# Patient Record
Sex: FEMALE | Race: WHITE
[De-identification: names, ages, dates, MRNs, and addresses within clinical notes are randomized per-mention and may not be internally consistent; named-entity substitution may affect disease eponyms.]

---

## 2023-01-30 ENCOUNTER — HOSPITAL ENCOUNTER (OUTPATIENT)
Dept: HOSPITAL 19 - COL.CAR | Age: 48
Discharge: HOME | End: 2023-01-30
Attending: INTERNAL MEDICINE
Payer: COMMERCIAL

## 2023-01-30 VITALS — SYSTOLIC BLOOD PRESSURE: 130 MMHG | HEART RATE: 59 BPM | TEMPERATURE: 98.3 F | DIASTOLIC BLOOD PRESSURE: 90 MMHG

## 2023-01-30 VITALS — SYSTOLIC BLOOD PRESSURE: 124 MMHG | DIASTOLIC BLOOD PRESSURE: 78 MMHG | HEART RATE: 50 BPM

## 2023-01-30 VITALS — WEIGHT: 158.95 LBS | BODY MASS INDEX: 26.48 KG/M2 | HEIGHT: 65 IN

## 2023-01-30 DIAGNOSIS — R55: Primary | ICD-10-CM

## 2023-01-30 NOTE — NUR
PT DISCHARGE INSTRUCTIONS REVIEWED. PT STATES UNDERSTANDING. IV REMOVED. PT
ACCOMPANIED TO ELEVATOR AMBULATING STEADILY, RESP EVEN AND UNLABORED